# Patient Record
Sex: FEMALE | Race: BLACK OR AFRICAN AMERICAN
[De-identification: names, ages, dates, MRNs, and addresses within clinical notes are randomized per-mention and may not be internally consistent; named-entity substitution may affect disease eponyms.]

---

## 2021-02-27 ENCOUNTER — HOSPITAL ENCOUNTER (EMERGENCY)
Dept: HOSPITAL 54 - ER | Age: 21
Discharge: HOME | End: 2021-02-27
Payer: COMMERCIAL

## 2021-02-27 VITALS — HEIGHT: 63 IN | WEIGHT: 135 LBS | BODY MASS INDEX: 23.92 KG/M2

## 2021-02-27 VITALS — DIASTOLIC BLOOD PRESSURE: 68 MMHG | SYSTOLIC BLOOD PRESSURE: 112 MMHG

## 2021-02-27 DIAGNOSIS — R00.2: ICD-10-CM

## 2021-02-27 DIAGNOSIS — F41.9: Primary | ICD-10-CM

## 2021-02-27 DIAGNOSIS — F45.8: ICD-10-CM

## 2021-02-27 LAB
ALBUMIN SERPL BCP-MCNC: 4 G/DL (ref 3.4–5)
ALP SERPL-CCNC: 75 U/L (ref 46–116)
ALT SERPL W P-5'-P-CCNC: 12 U/L (ref 12–78)
AST SERPL W P-5'-P-CCNC: 15 U/L (ref 15–37)
BASOPHILS # BLD AUTO: 0.1 /CMM (ref 0–0.2)
BASOPHILS NFR BLD AUTO: 1.2 % (ref 0–2)
BILIRUB SERPL-MCNC: 0.4 MG/DL (ref 0.2–1)
BILIRUB UR QL STRIP: NEGATIVE
BUN SERPL-MCNC: 10 MG/DL (ref 7–18)
CALCIUM SERPL-MCNC: 9.1 MG/DL (ref 8.5–10.1)
CHLORIDE SERPL-SCNC: 105 MMOL/L (ref 98–107)
CO2 SERPL-SCNC: 25 MMOL/L (ref 21–32)
COLOR UR: YELLOW
CREAT SERPL-MCNC: 1 MG/DL (ref 0.6–1.3)
EOSINOPHIL NFR BLD AUTO: 0.4 % (ref 0–6)
GLUCOSE SERPL-MCNC: 83 MG/DL (ref 74–106)
GLUCOSE UR STRIP-MCNC: NEGATIVE MG/DL
HCT VFR BLD AUTO: 37 % (ref 33–45)
HGB BLD-MCNC: 12.1 G/DL (ref 11.5–14.8)
LEUKOCYTE ESTERASE UR QL STRIP: NEGATIVE
LYMPHOCYTES NFR BLD AUTO: 1.8 /CMM (ref 0.8–4.8)
LYMPHOCYTES NFR BLD AUTO: 20.2 % (ref 20–44)
MCHC RBC AUTO-ENTMCNC: 33 G/DL (ref 31–36)
MCV RBC AUTO: 85 FL (ref 82–100)
MONOCYTES NFR BLD AUTO: 0.6 /CMM (ref 0.1–1.3)
MONOCYTES NFR BLD AUTO: 7 % (ref 2–12)
NEUTROPHILS # BLD AUTO: 6.3 /CMM (ref 1.8–8.9)
NEUTROPHILS NFR BLD AUTO: 71.2 % (ref 43–81)
NITRITE UR QL STRIP: NEGATIVE
PH UR STRIP: 6.5 [PH] (ref 5–8)
PLATELET # BLD AUTO: 203 /CMM (ref 150–450)
POTASSIUM SERPL-SCNC: 3.4 MMOL/L (ref 3.5–5.1)
PROT SERPL-MCNC: 7.3 G/DL (ref 6.4–8.2)
PROT UR QL STRIP: NEGATIVE MG/DL
RBC # BLD AUTO: 4.35 MIL/UL (ref 4–5.2)
SODIUM SERPL-SCNC: 142 MMOL/L (ref 136–145)
TSH SERPL DL<=0.005 MIU/L-ACNC: 2.65 UIU/ML (ref 0.36–3.74)
UROBILINOGEN UR STRIP-MCNC: 0.2 EU/DL
WBC NRBC COR # BLD AUTO: 8.8 K/UL (ref 4.3–11)

## 2021-02-27 NOTE — NUR
Patient discharged to home in stable condition. Written and verbal after care 
instructions given. Patient verbalizes understanding of instruction and RX. Pt 
ambulated out of ED. VSS.

## 2021-02-27 NOTE — NUR
CAME IN FOR PALPITATIONS AND NUMBNESS TO FINGERS WHILE DRIVING. TO ER BED 10, 
DENIES PALPITATIONS UPON ARRIVAL IN ED. CHANGED TO HOSP GOWN, HOOKED TO CARDIAC 
MONITOR, BP CUFF AND POX. VSS. NP DEGRASSE AT BEDSIDE

## 2021-10-09 ENCOUNTER — HOSPITAL ENCOUNTER (EMERGENCY)
Dept: HOSPITAL 87 - ER | Age: 21
Discharge: HOME | End: 2021-10-09
Payer: COMMERCIAL

## 2021-10-09 VITALS — SYSTOLIC BLOOD PRESSURE: 117 MMHG | DIASTOLIC BLOOD PRESSURE: 63 MMHG

## 2021-10-09 VITALS — HEIGHT: 63 IN | WEIGHT: 145.51 LBS | BODY MASS INDEX: 25.78 KG/M2

## 2021-10-09 DIAGNOSIS — R06.00: Primary | ICD-10-CM

## 2021-10-09 DIAGNOSIS — F41.9: ICD-10-CM

## 2021-10-09 PROCEDURE — 81025 URINE PREGNANCY TEST: CPT

## 2021-10-09 PROCEDURE — 99283 EMERGENCY DEPT VISIT LOW MDM: CPT

## 2021-10-09 PROCEDURE — 93005 ELECTROCARDIOGRAM TRACING: CPT

## 2021-12-10 ENCOUNTER — HOSPITAL ENCOUNTER (EMERGENCY)
Dept: HOSPITAL 87 - ER | Age: 21
Discharge: HOME | End: 2021-12-10
Payer: COMMERCIAL

## 2021-12-10 VITALS — SYSTOLIC BLOOD PRESSURE: 143 MMHG | DIASTOLIC BLOOD PRESSURE: 97 MMHG

## 2021-12-10 VITALS — WEIGHT: 136.69 LBS | BODY MASS INDEX: 26.84 KG/M2 | HEIGHT: 60 IN

## 2021-12-10 DIAGNOSIS — R06.02: ICD-10-CM

## 2021-12-10 DIAGNOSIS — F41.9: Primary | ICD-10-CM

## 2021-12-10 PROCEDURE — 99283 EMERGENCY DEPT VISIT LOW MDM: CPT

## 2021-12-10 PROCEDURE — 93005 ELECTROCARDIOGRAM TRACING: CPT

## 2021-12-10 PROCEDURE — 81025 URINE PREGNANCY TEST: CPT

## 2022-04-23 ENCOUNTER — HOSPITAL ENCOUNTER (EMERGENCY)
Dept: HOSPITAL 87 - ER | Age: 22
Discharge: HOME | End: 2022-04-23
Payer: COMMERCIAL

## 2022-04-23 VITALS — HEIGHT: 60 IN | BODY MASS INDEX: 29.43 KG/M2 | WEIGHT: 149.91 LBS

## 2022-04-23 VITALS — DIASTOLIC BLOOD PRESSURE: 64 MMHG | SYSTOLIC BLOOD PRESSURE: 112 MMHG

## 2022-04-23 DIAGNOSIS — B34.9: ICD-10-CM

## 2022-04-23 DIAGNOSIS — F41.9: ICD-10-CM

## 2022-04-23 DIAGNOSIS — N30.00: Primary | ICD-10-CM

## 2022-04-23 DIAGNOSIS — I10: ICD-10-CM

## 2022-04-23 DIAGNOSIS — Z20.822: ICD-10-CM

## 2022-04-23 LAB
APPEARANCE UR: CLEAR
CHLORIDE SERPL-SCNC: 106 MEQ/L (ref 98–107)
COLOR UR: YELLOW
ERYTHROCYTE [DISTWIDTH] IN BLOOD BY AUTOMATED COUNT: 14.6 % (ref 11.6–14.6)
HCT VFR BLD AUTO: 40.4 % (ref 36–48)
HGB BLD-MCNC: 13.1 G/DL (ref 12–16)
HGB UR QL STRIP: NEGATIVE
KETONES UR STRIP-MCNC: NEGATIVE MG/DL
LEUKOCYTE ESTERASE UR QL STRIP: (no result)
LYMPHOCYTES NFR BLD MANUAL: 7 % (ref 20–60)
MCH RBC QN AUTO: 26.2 PG (ref 28–32)
MCV RBC AUTO: 81 FL (ref 81–99)
MONOCYTES NFR BLD MANUAL: 3 % (ref 2–8)
NEUTS SEG NFR BLD MANUAL: 90 % (ref 45–75)
NITRITE UR QL STRIP: NEGATIVE
PH UR STRIP: 6 [PH] (ref 4.5–8)
PLATELET # BLD AUTO: 125 X1000/UL (ref 130–400)
PLATELET # BLD EST: (no result) 10*3/UL
PMV BLD AUTO: 9.3 FL (ref 7.4–10.4)
PROT UR QL STRIP: NEGATIVE
RBC # BLD AUTO: 4.99 MILL/UL (ref 4.2–5.4)
SP GR UR STRIP: 1.02 (ref 1–1.03)
UROBILINOGEN UR STRIP-MCNC: 1 E.U./DL (ref 0.2–1)

## 2022-04-23 PROCEDURE — 87426 SARSCOV CORONAVIRUS AG IA: CPT

## 2022-04-23 PROCEDURE — 85025 COMPLETE CBC W/AUTO DIFF WBC: CPT

## 2022-04-23 PROCEDURE — 87040 BLOOD CULTURE FOR BACTERIA: CPT

## 2022-04-23 PROCEDURE — 96361 HYDRATE IV INFUSION ADD-ON: CPT

## 2022-04-23 PROCEDURE — 93005 ELECTROCARDIOGRAM TRACING: CPT

## 2022-04-23 PROCEDURE — 81003 URINALYSIS AUTO W/O SCOPE: CPT

## 2022-04-23 PROCEDURE — 81025 URINE PREGNANCY TEST: CPT

## 2022-04-23 PROCEDURE — 71045 X-RAY EXAM CHEST 1 VIEW: CPT

## 2022-04-23 PROCEDURE — 96374 THER/PROPH/DIAG INJ IV PUSH: CPT

## 2022-04-23 PROCEDURE — 87804 INFLUENZA ASSAY W/OPTIC: CPT

## 2022-04-23 PROCEDURE — 36415 COLL VENOUS BLD VENIPUNCTURE: CPT

## 2022-04-23 PROCEDURE — 80053 COMPREHEN METABOLIC PANEL: CPT

## 2022-04-23 PROCEDURE — 99285 EMERGENCY DEPT VISIT HI MDM: CPT

## 2022-04-23 PROCEDURE — 84443 ASSAY THYROID STIM HORMONE: CPT
